# Patient Record
Sex: FEMALE | Race: WHITE | Employment: OTHER | URBAN - METROPOLITAN AREA
[De-identification: names, ages, dates, MRNs, and addresses within clinical notes are randomized per-mention and may not be internally consistent; named-entity substitution may affect disease eponyms.]

---

## 2017-07-08 ENCOUNTER — ED HISTORICAL/CONVERTED ENCOUNTER (OUTPATIENT)
Dept: OTHER | Age: 57
End: 2017-07-08

## 2017-09-05 ENCOUNTER — ED HISTORICAL/CONVERTED ENCOUNTER (OUTPATIENT)
Dept: OTHER | Age: 57
End: 2017-09-05

## 2017-09-21 ENCOUNTER — OP HISTORICAL/CONVERTED ENCOUNTER (OUTPATIENT)
Dept: OTHER | Age: 57
End: 2017-09-21

## 2022-04-08 ENCOUNTER — HOSPITAL ENCOUNTER (OUTPATIENT)
Age: 62
Setting detail: OUTPATIENT SURGERY
Discharge: HOME | End: 2022-04-08
Attending: INTERNAL MEDICINE | Admitting: INTERNAL MEDICINE
Payer: MEDICARE

## 2022-04-08 ENCOUNTER — ANESTHESIA (OUTPATIENT)
Dept: ENDOSCOPY | Age: 62
End: 2022-04-08
Payer: MEDICARE

## 2022-04-08 ENCOUNTER — ANESTHESIA EVENT (OUTPATIENT)
Dept: ENDOSCOPY | Age: 62
End: 2022-04-08
Payer: MEDICARE

## 2022-04-08 VITALS
HEART RATE: 72 BPM | RESPIRATION RATE: 19 BRPM | TEMPERATURE: 98.4 F | SYSTOLIC BLOOD PRESSURE: 134 MMHG | HEIGHT: 63 IN | DIASTOLIC BLOOD PRESSURE: 71 MMHG | WEIGHT: 136.91 LBS | BODY MASS INDEX: 24.26 KG/M2 | OXYGEN SATURATION: 97 %

## 2022-04-08 PROCEDURE — 2709999900 HC NON-CHARGEABLE SUPPLY: Performed by: INTERNAL MEDICINE

## 2022-04-08 PROCEDURE — 76040000019: Performed by: INTERNAL MEDICINE

## 2022-04-08 PROCEDURE — 74011250636 HC RX REV CODE- 250/636: Performed by: NURSE ANESTHETIST, CERTIFIED REGISTERED

## 2022-04-08 PROCEDURE — 76060000031 HC ANESTHESIA FIRST 0.5 HR: Performed by: INTERNAL MEDICINE

## 2022-04-08 RX ORDER — NAPROXEN SODIUM 220 MG
220 TABLET ORAL 2 TIMES DAILY WITH MEALS
COMMUNITY

## 2022-04-08 RX ORDER — MIDAZOLAM HYDROCHLORIDE 1 MG/ML
.25-5 INJECTION, SOLUTION INTRAMUSCULAR; INTRAVENOUS
Status: DISCONTINUED | OUTPATIENT
Start: 2022-04-08 | End: 2022-04-08 | Stop reason: HOSPADM

## 2022-04-08 RX ORDER — SODIUM CHLORIDE 9 MG/ML
INJECTION, SOLUTION INTRAVENOUS
Status: DISCONTINUED | OUTPATIENT
Start: 2022-04-08 | End: 2022-04-08 | Stop reason: HOSPADM

## 2022-04-08 RX ORDER — NALOXONE HYDROCHLORIDE 0.4 MG/ML
0.4 INJECTION, SOLUTION INTRAMUSCULAR; INTRAVENOUS; SUBCUTANEOUS
Status: DISCONTINUED | OUTPATIENT
Start: 2022-04-08 | End: 2022-04-08 | Stop reason: HOSPADM

## 2022-04-08 RX ORDER — ESOMEPRAZOLE MAGNESIUM 40 MG/1
CAPSULE, DELAYED RELEASE ORAL DAILY
COMMUNITY

## 2022-04-08 RX ORDER — PROPOFOL 10 MG/ML
INJECTION, EMULSION INTRAVENOUS AS NEEDED
Status: DISCONTINUED | OUTPATIENT
Start: 2022-04-08 | End: 2022-04-08 | Stop reason: HOSPADM

## 2022-04-08 RX ORDER — UREA 10 %
100 LOTION (ML) TOPICAL DAILY
COMMUNITY

## 2022-04-08 RX ORDER — ALENDRONATE SODIUM 70 MG/1
TABLET ORAL
COMMUNITY

## 2022-04-08 RX ORDER — SODIUM CHLORIDE 9 MG/ML
50 INJECTION, SOLUTION INTRAVENOUS CONTINUOUS
Status: DISCONTINUED | OUTPATIENT
Start: 2022-04-08 | End: 2022-04-08 | Stop reason: HOSPADM

## 2022-04-08 RX ORDER — ATROPINE SULFATE 0.1 MG/ML
0.5 INJECTION INTRAVENOUS
Status: DISCONTINUED | OUTPATIENT
Start: 2022-04-08 | End: 2022-04-08 | Stop reason: HOSPADM

## 2022-04-08 RX ORDER — FLUMAZENIL 0.1 MG/ML
0.2 INJECTION INTRAVENOUS
Status: DISCONTINUED | OUTPATIENT
Start: 2022-04-08 | End: 2022-04-08 | Stop reason: HOSPADM

## 2022-04-08 RX ORDER — EPINEPHRINE 0.1 MG/ML
1 INJECTION INTRACARDIAC; INTRAVENOUS
Status: DISCONTINUED | OUTPATIENT
Start: 2022-04-08 | End: 2022-04-08 | Stop reason: HOSPADM

## 2022-04-08 RX ORDER — PROPOFOL 10 MG/ML
INJECTION, EMULSION INTRAVENOUS
Status: DISCONTINUED | OUTPATIENT
Start: 2022-04-08 | End: 2022-04-08 | Stop reason: HOSPADM

## 2022-04-08 RX ORDER — FENTANYL CITRATE 50 UG/ML
100 INJECTION, SOLUTION INTRAMUSCULAR; INTRAVENOUS
Status: DISCONTINUED | OUTPATIENT
Start: 2022-04-08 | End: 2022-04-08 | Stop reason: HOSPADM

## 2022-04-08 RX ORDER — OMEPRAZOLE 40 MG/1
40 CAPSULE, DELAYED RELEASE ORAL DAILY
COMMUNITY

## 2022-04-08 RX ORDER — DEXTROMETHORPHAN/PSEUDOEPHED 2.5-7.5/.8
1.2 DROPS ORAL
Status: DISCONTINUED | OUTPATIENT
Start: 2022-04-08 | End: 2022-04-08 | Stop reason: HOSPADM

## 2022-04-08 RX ADMIN — PROPOFOL 200 MCG/KG/MIN: 10 INJECTION, EMULSION INTRAVENOUS at 14:16

## 2022-04-08 RX ADMIN — SODIUM CHLORIDE: 9 INJECTION, SOLUTION INTRAVENOUS at 14:12

## 2022-04-08 RX ADMIN — PROPOFOL INJECTABLE EMULSION 50 MG: 10 INJECTION, EMULSION INTRAVENOUS at 14:16

## 2022-04-08 NOTE — ANESTHESIA POSTPROCEDURE EVALUATION
Procedure(s):  COLONOSCOPY. MAC    Anesthesia Post Evaluation      Multimodal analgesia: multimodal analgesia not used between 6 hours prior to anesthesia start to PACU discharge  Patient location during evaluation: PACU  Patient participation: complete - patient participated  Level of consciousness: awake and alert  Pain score: 0  Pain management: adequate  Airway patency: patent  Anesthetic complications: no  Cardiovascular status: hemodynamically stable and acceptable  Respiratory status: acceptable  Hydration status: acceptable  Comments: Patient seen and evaluated; no concerns. Post anesthesia nausea and vomiting:  none      INITIAL Post-op Vital signs:   Vitals Value Taken Time   /65 04/08/22 1445   Temp 36.9 °C (98.4 °F) 04/08/22 1435   Pulse 78 04/08/22 1447   Resp 21 04/08/22 1447   SpO2 96 % 04/08/22 1447   Vitals shown include unvalidated device data.

## 2022-04-08 NOTE — ANESTHESIA PREPROCEDURE EVALUATION
Relevant Problems   No relevant active problems       Anesthetic History   No history of anesthetic complications            Review of Systems / Medical History  Patient summary reviewed, nursing notes reviewed and pertinent labs reviewed    Pulmonary          Smoker         Neuro/Psych   Within defined limits           Cardiovascular  Within defined limits                Exercise tolerance: >4 METS     GI/Hepatic/Renal  Within defined limits              Endo/Other        Arthritis     Other Findings              Physical Exam    Airway  Mallampati: II    Neck ROM: normal range of motion   Mouth opening: Normal     Cardiovascular  Regular rate and rhythm,  S1 and S2 normal,  no murmur, click, rub, or gallop  Rhythm: regular  Rate: normal         Dental  No notable dental hx       Pulmonary  Breath sounds clear to auscultation               Abdominal  GI exam deferred       Other Findings            Anesthetic Plan    ASA: 2  Anesthesia type: MAC          Induction: Intravenous  Anesthetic plan and risks discussed with: Patient

## 2022-04-08 NOTE — H&P
Gastroenterology Outpatient History and Physical    Patient: Saurabh Nguyễn    Physician: Marta Hubbard MD    Vital Signs: See nursing notes    Allergies: No Known Allergies    Chief Complaint: Polyp surveillance    History of Present Illness: No symptoms; no chest pain, SOB, edema, focal weakness, numbness    Justification for Procedure: Polyp surveillance    History:  Past Medical History:   Diagnosis Date    Arthritis     shoulder, neck    Psychiatric disorder     depression,anxiety      Past Surgical History:   Procedure Laterality Date    HX HEENT      teeth extracted for periodontal disease    HX HEENT Bilateral     Lasik      Social History     Socioeconomic History    Marital status:    Tobacco Use    Smoking status: Former Smoker     Packs/day: 0.25     Years: 30.00     Pack years: 7.50     Quit date: 11/15/2013     Years since quittin.3    Smokeless tobacco: Never Used   Substance and Sexual Activity    Alcohol use: No    Drug use: No      Family History   Problem Relation Age of Onset   [facility] Cancer Father         lung       Medications:   Prior to Admission medications    Medication Sig Start Date End Date Taking? Authorizing Provider   oxyCODONE-acetaminophen (PERCOCET) 5-325 mg per tablet Take 1 Tab by mouth every four (4) hours as needed for Pain. 14   Elizabeth Tidwell MD   cyclobenzaprine (FLEXERIL) 10 mg tablet Take 1 Tab by mouth three (3) times daily as needed for Muscle Spasm(s). 14   Elizabeth Tidwell MD   mupirocin (BACTROBAN) 2 % ointment Apply  to affected area two (2) times a day. 14   Yarelis Mc MD   albuterol (PROVENTIL, VENTOLIN) 90 mcg/actuation inhaler Take 1-2 Puffs by inhalation every four (4) hours as needed for Wheezing. 13   Chay Day MD       Physical Exam:   General: alert, cooperative, no distress   HEENT: Head: Normal, normocephalic, atraumatic.    Heart: regular rate and rhythm   Lungs: chest clear, no wheezing, rales, normal symmetric air entry   Abdominal: Bowel sounds are normal, liver is not enlarged, spleen is not enlarged           Findings/Diagnosis: Polyp surveillance    Plan of Care/Planned Procedure: I've discussed colonoscopy possible biopsy, polypectomy, cautery, injection, alternatives, complications including but not limited to pain, cardiopulmonary event, bleeding, perforation requiring additional blood transfusion or operative repair; all questions answered.

## 2022-04-08 NOTE — PROGRESS NOTES
Edith Leachstone  1960  [id number]    Situation:  Verbal report received from: Coral Stagers, RN  Procedure: Procedure(s):  COLONOSCOPY    Background:    Preoperative diagnosis: PERSONAL HISTORY POLYPS  Postoperative diagnosis: 1. diverticulosis    :  Dr. Angelica Travis  Assistant(s): Endoscopy RN-1: Ana M Mercer RN  Endoscopy RN-2: Dayna Lewis RN    Specimens: * No specimens in log *  H. Pylori  no    Assessment:  Intra-procedure medications     Anesthesia gave intra-procedure sedation and medications, see anesthesia flow sheet yes    Intravenous fluids: NS@ KVO     Vital signs stable yes    Abdominal assessment: round and soft yes    Recommendation:  Discharge patient per MD order yes.   Return to floor na  Family or Friend family  Permission to share finding with family or friend yes

## 2022-04-08 NOTE — PROGRESS NOTES
Endoscopy discharge instructions have been reviewed and given to patient. The patient verbalized understanding and acceptance of instructions. Dr. Lauren Mak discussed with patient procedure findings and next steps.

## 2022-04-08 NOTE — DISCHARGE INSTRUCTIONS
Ameena Miller  [id number]  1960    DISCHARGE INSTRUCTIONS    Results:    Impression:  personal history polyps without recurrence; incidental finding colonic diverticulosis without inflammation. Recommendations:      - high-fiber diet, 10 year routine follow-up exam  Discomfort:  Redness at IV site- apply warm compress to area; if redness or soreness persist- contact your physician. There may be a slight amount of blood passed from the rectum. Gaseous discomfort - walking, belching will help relieve any discomfort. You may not operate a vehicle for 12 hours. You may not engage in an occupation involving machinery or appliances for rest of today. You may not drink alcoholic beverages for at least 12 hours. Avoid making any critical decisions for at least 24 hours. DIET:   High fiber diet. Medications:                Resume usual medications today   ACTIVITY:  You may resume your normal daily activities it is recommended that you spend the remainder of the day resting -  avoid any strenuous activity. CALL M.D. ANY SIGN OF:   Increasing pain, nausea, vomiting  Abdominal distension (swelling)  New increased bleeding (oral or rectal)  Fever (chills)  Pain in chest area  Bloody discharge from nose or mouth  Shortness of breath     Follow-up Instructions:  Call Dr. Lauren Lundberg if you have any questions or problems.           DISCHARGE SUMMARY from Nurse    The following personal items collected during your admission are returned to you:   Dental Appliance: Dental Appliances: Uppers  Vision: Visual Aid: None  Hearing Aid:    Jewelry:    Clothing:    Other Valuables:    Valuables sent to safe:

## 2022-04-08 NOTE — PERIOP NOTES
Report from [facility] see anesthesia record. Abdomen remains soft, non-tender; pt denies pain. Scope pre-cleaned at bedside by [facility] CASPER WHITE/Jamel. Report given to [facility] in Recovery.

## 2022-04-08 NOTE — PROCEDURES
[address]  [phone]      2022    Colonoscopy Procedure Note  Nuvia Valderrama  :  1960  [facility] Medical Record Number: [id number]    Indications:     Personal history of colon polyps (screening only)  PCP:  Lalito Meraz MD  Anesthesia/Sedation: see nursing notes  Endoscopist:  Dr. Felix Powers  Assistants: None  Complications:  None  Estimate Blood Loss:  None    Permit:  The indications, risks, benefits and alternatives were reviewed with the patient or their decision maker who was provided an opportunity to ask questions and all questions were answered. The specific risks of colonoscopy with conscious sedation were reviewed, including but not limited to anesthetic complication, bleeding, adverse drug reaction, missed lesion, infection, IV site reactions, and intestinal perforation which would lead to the need for surgical repair. Alternatives to colonoscopy including radiographic imaging, observation without testing, or laboratory testing were reviewed including the limitations of those alternatives. After considering the options and having all their questions answered, the patient or their decision maker provided both verbal and written consent to proceed. Procedure in Detail:  After obtaining informed consent, positioning of the patient in the left lateral decubitus position, and conduction of a pre-procedure pause or \"time out\" the endoscope was introduced into the anus and advanced to the cecum, which was identified by the ileocecal valve and appendiceal orifice. The quality of the colonic preparation was good. A careful inspection was made as the colonoscope was withdrawn, findings and interventions are described below.     Appendiceal orifice photographed    Findings:   no mucosal lesion appreciated      - Diverticulosis without inflammation    Specimens:    none    Implants: none    Complications:   None; patient tolerated the procedure well. Estimated blood loss: none    Impression:  personal history polyps without recurrence; incidental finding colonic diverticulosis without inflammation. Recommendations:      - high-fiber diet, 10 year routine follow-up exam    Thank you for entrusting me with this patient's care. Please do not hesitate to contact me with any questions or if I can be of assistance with any of your other patients' GI needs.     Signed By: Michael Andre MD                        April 8, 2022

## 2023-03-15 ENCOUNTER — HOSPITAL ENCOUNTER (OUTPATIENT)
Dept: GENERAL RADIOLOGY | Age: 63
Discharge: HOME | End: 2023-03-15
Attending: PODIATRIST
Payer: MEDICARE

## 2023-03-15 ENCOUNTER — HOSPITAL ENCOUNTER (OUTPATIENT)
Dept: PREADMISSION TESTING | Age: 63
Discharge: HOME | End: 2023-03-15
Payer: MEDICARE

## 2023-03-15 VITALS
HEIGHT: 63 IN | SYSTOLIC BLOOD PRESSURE: 138 MMHG | RESPIRATION RATE: 16 BRPM | BODY MASS INDEX: 25.98 KG/M2 | TEMPERATURE: 98 F | OXYGEN SATURATION: 98 % | HEART RATE: 72 BPM | WEIGHT: 146.61 LBS | DIASTOLIC BLOOD PRESSURE: 75 MMHG

## 2023-03-15 LAB
ALBUMIN SERPL-MCNC: 4 G/DL (ref 3.5–5)
ALBUMIN/GLOB SERPL: 1.2 (ref 1.1–2.2)
ALP SERPL-CCNC: 108 U/L (ref 45–117)
ALT SERPL-CCNC: 17 U/L (ref 12–78)
ANION GAP SERPL CALC-SCNC: 2 MMOL/L (ref 5–15)
AST SERPL-CCNC: 15 U/L (ref 15–37)
ATRIAL RATE: 80 BPM
BILIRUB SERPL-MCNC: 0.5 MG/DL (ref 0.2–1)
BUN SERPL-MCNC: 12 MG/DL (ref 6–20)
BUN/CREAT SERPL: 17 (ref 12–20)
CALCIUM SERPL-MCNC: 9.5 MG/DL (ref 8.5–10.1)
CALCULATED P AXIS, ECG09: 6 DEGREES
CALCULATED R AXIS, ECG10: 16 DEGREES
CALCULATED T AXIS, ECG11: 59 DEGREES
CHLORIDE SERPL-SCNC: 109 MMOL/L (ref 97–108)
CO2 SERPL-SCNC: 29 MMOL/L (ref 21–32)
CREAT SERPL-MCNC: 0.7 MG/DL (ref 0.55–1.02)
DIAGNOSIS, 93000: NORMAL
GLOBULIN SER CALC-MCNC: 3.4 G/DL (ref 2–4)
GLUCOSE SERPL-MCNC: 95 MG/DL (ref 65–100)
P-R INTERVAL, ECG05: 166 MS
POTASSIUM SERPL-SCNC: 4.2 MMOL/L (ref 3.5–5.1)
PROT SERPL-MCNC: 7.4 G/DL (ref 6.4–8.2)
Q-T INTERVAL, ECG07: 358 MS
QRS DURATION, ECG06: 82 MS
QTC CALCULATION (BEZET), ECG08: 412 MS
SODIUM SERPL-SCNC: 140 MMOL/L (ref 136–145)
VENTRICULAR RATE, ECG03: 80 BPM

## 2023-03-15 PROCEDURE — 80053 COMPREHEN METABOLIC PANEL: CPT

## 2023-03-15 PROCEDURE — 93005 ELECTROCARDIOGRAM TRACING: CPT

## 2023-03-15 PROCEDURE — 71046 X-RAY EXAM CHEST 2 VIEWS: CPT

## 2023-03-15 PROCEDURE — 36415 COLL VENOUS BLD VENIPUNCTURE: CPT

## 2023-03-15 RX ORDER — ATORVASTATIN CALCIUM 10 MG/1
10 TABLET, FILM COATED ORAL
COMMUNITY

## 2023-03-15 RX ORDER — ASPIRIN 81 MG/1
81 TABLET ORAL
COMMUNITY

## 2023-03-15 NOTE — PERIOP NOTES
[address] [facility] [address], [address] [facility]   MAIN OR                                  [phone]   MAIN PRE OP                          [phone]                                                                                AMBULATORY PRE OP          [phone]  PRE-ADMISSION TESTING    [phone]   Surgery Date: Wednesday 3/22/23       Is surgery arrival time given by surgeon? NO  If NO, [facility] staff will call you between 3 and 7pm the day before your surgery with your arrival time. (If your surgery is on a Monday, we will call you the Friday before.)    Call [phone] after 7pm Monday-Friday if you did not receive this call. INSTRUCTIONS BEFORE YOUR SURGERY   When You  Arrive Arrive at the 2nd [address] [facility] on the day of your surgery  Have your insurance card, photo ID, and any copayment (if needed)   Food   and   Drink NO food or drink after midnight the night before surgery    This means NO water, gum, mints, coffee, juice, etc.  No alcohol (beer, wine, liquor) 24 hours before and after surgery   Medications to   TAKE   Morning of Surgery MEDICATIONS TO TAKE THE MORNING OF SURGERY WITH A SIP OF WATER:   Trelegy  Esomeprazole     Medications  To  STOP      7 days before surgery Non-Steroidal anti-inflammatory Drugs (NSAID's): for example, Ibuprofen (Advil, Motrin), Naproxen (Aleve)  Aspirin, if taking for pain   Herbal supplements, vitamins, and fish oil  Other:  (Pain medications not listed above, including Tylenol may be taken)   Blood  Thinners    Bathing Clothing  Jewelry  Valuables     If you shower the morning of surgery, please do not apply anything to your skin (lotions, powders, deodorant, or makeup, especially mascara)  Follow Chlorhexidine [employer] body wash instructions provided to you during PAT appointment. Begin 3 days prior to surgery.   Do not shave or trim anywhere 24 hours before surgery  Wear your hair loose or down; no pony-tails, buns, or metal hair clips  Wear loose, comfortable, clean clothes  Wear glasses instead of contacts  Leave money, valuables, and jewelry, including body piercings, at home       Going Home - or Spending the Night SAME-DAY SURGERY: You must have a responsible adult drive you home and stay with you 24 hours after surgery  ADMITS: If your doctor is keeping you in the hospital after surgery, leave personal belongings/luggage in your car until you have a hospital room number. Hospital discharge time is 12 noon  Drivers must be here before 12 noon unless you are told differently   Special Instructions Please review educational handout and contact surgeon with questions about the procedure       Follow all instructions so your surgery wont be cancelled. Please, be on time. If a situation occurs and you are delayed the day of surgery, call [phone] or [phone]. If your physical condition changes (like a fever, cold, flu, etc.) call your surgeon. Home medication(s) reviewed and verified via     LIST   VERBAL   during PAT appointment. The patient was contacted by     IN-PERSON  The patient verbalizes understanding of all instructions and      DOES NOT   need reinforcement.

## 2023-03-15 NOTE — H&P
Gold Moulton was referred for evaluation by:Dr. Simeon Garcia for Pre- Op Evaluation. Please see encounter details and orders for consultative summary. Type of surgery : Right foot Silver bunionectomy, Right fifth metatarsal ostectomy  Surgery site : Right foot  Anesthesia type: MAC  Date of procedure:  3/22/2023    This 60y.o. year old female presents with complaints of pain to right foot; has bunion to great toe and bunionette to right 5th toe. Has been present for years but becoming more uncomfortable. Conservative measures have been exhausted prior to the decision for surgery. Patient has discussed the risks, alternatives, and benefits of the surgery with surgeon and has elected to proceed with surgical intervention. History of COPD and sees Dr Charmayne Chestnut with Pulmonary; reports she has been stable since starting Trelegy. Allergies: No Known Allergies  Latex allergy: no  Prior reactions to anesthesia:  Early awakening during iliac stenting and carpal tunnel surgery. Current Outpatient Medications   Medication Sig    atorvastatin (LIPITOR) 10 mg tablet Take 10 mg by mouth every morning. aspirin delayed-release 81 mg tablet Take 81 mg by mouth every morning. MULTIVITAMIN PO Take 1 Tablet by mouth every morning. BIOTIN PO Take 1 Tablet by mouth every morning. fluticasone/umeclidin/vilanter (TRELEGY ELLIPTA IN) Take 1 Puff by inhalation two (2) times a day. alendronate (FOSAMAX) 70 mg tablet Take 70 mg by mouth every seven (7) days. esomeprazole (NEXIUM) 40 mg capsule Take 40 mg by mouth every morning. No current facility-administered medications for this encounter.      Past Medical History:   Diagnosis Date    Adverse effect of anesthesia     woke up during iliac stenting and carpal tunnel release    Anxiety and depression     Arthritis     shoulder, neck    Cataract     not requiring surgery at this time    Chronic obstructive pulmonary disease ([facility] [address]     Diverticulitis Fibromyalgia     H/O seasonal allergies     Lumbar herniated disc     x3    PAD (peripheral artery disease) ([facility] [address]      Past Surgical History:   Procedure Laterality Date    COLONOSCOPY N/A 04/08/2022    COLONOSCOPY performed by Jamarcus Montoya MD at [address] Right     HX CERVICAL FUSION  2013    C5-C7    HX REFRACTIVE SURGERY Bilateral 2003    HX VEIN STRIPPING Right 2018    ME UNLISTED PROCEDURE VASCULAR SURGERY  2022    stents placed bilateral iliac arteries     Social History     Tobacco Use    Smoking status: Some Days     Packs/day: 0.25     Years: 40.00     Pack years: 10.00     Types: Cigarettes    Smokeless tobacco: Never    Tobacco comments:     Smoking sporadically   Vaping Use    Vaping Use: Never used   Substance Use Topics    Alcohol use: No    Drug use: No     Family History   Problem Relation Age of Onset    Cancer Father         lung        Pre-Operative Risk Assessment Using 2014 ACC/AHA Guidelines     Emergent procedure: No  Active Cardiac Condition including decompensated HF, Arrhythmia, MI <3 weeks, severe valve disease: No  Risk Level of Procedure: Intermediate Risk (intraperitoneal, intrathoracic, HENT, orthopedic, or carotid endarterectomy, etc.)  Revised Cardiac Risk Index Risk factors: None 3.9 % risk of cardiac complications during or around noncardiac surgery (30 day risk of Death, MI or Cardiac arrest). Measurement of Exercise Tolerance before Surgery >4 METS (climbing > 1 flight of stairs without stopping, walking up hill > 1-2 blocks, scrubbing floors, moving furniture, golf, bowling, dancing or tennis, or running short distance): Yes    History of cardiac (including arrhythmic or valvular) or lung issues? COPD  Personal or family of bleeding issues? No  Hx of HTN? No        Controlled? N/A     Smoker? 0.25 PPD x 40+ years  Etoh or other substance use? No     On anticoagulation, insulin, or steroids? No  Any concern with current medications? No  Pacemaker present? No        and if so, has it been interrogated in the last 12 months? N/A  Known TIANA? No   Known liver disease? No    Blood pressure 138/75, pulse 72, temperature 98 °F (36.7 °C), resp. rate 16, height 5' 3\" (1.6 m), weight 66.5 kg (146 lb 9.7 oz), SpO2 98 %. Review of Systems  Review of Systems   Constitutional:  Negative for chills, fatigue and fever. HENT:  Negative for congestion, ear pain, postnasal drip and trouble swallowing. Eyes:  Negative for discharge. Respiratory:  Negative for cough, shortness of breath and wheezing. Cardiovascular:  Negative for chest pain, palpitations and leg swelling. Gastrointestinal:  Negative for abdominal pain, constipation, diarrhea, nausea and vomiting. Genitourinary:  Negative for dysuria, flank pain, frequency and urgency. Musculoskeletal:  Positive for arthralgias (right foot). Skin:  Negative for rash and wound. Neurological:  Negative for light-headedness and headaches. Psychiatric/Behavioral:  Negative for dysphoric mood. The patient is not nervous/anxious. Physical Exam  Vitals and nursing note reviewed. Exam conducted with a chaperone present. Constitutional:       General: She is not in acute distress. Appearance: Normal appearance. HENT:      Head: Normocephalic and atraumatic. Right Ear: Tympanic membrane, ear canal and external ear normal.      Left Ear: Tympanic membrane and external ear normal.      Nose: Nose normal.      Mouth/Throat:      Mouth: Mucous membranes are moist.      Pharynx: Oropharynx is clear. Eyes:      Extraocular Movements: Extraocular movements intact. Cardiovascular:      Rate and Rhythm: Normal rate and regular rhythm. Pulses: Normal pulses. Heart sounds: Normal heart sounds. Pulmonary:      Effort: Pulmonary effort is normal.      Breath sounds: Normal breath sounds. Abdominal:      General: Bowel sounds are normal.      Palpations: Abdomen is soft. Tenderness: There is no abdominal tenderness. There is no right CVA tenderness or left CVA tenderness. Musculoskeletal:         General: Normal range of motion. Cervical back: Normal range of motion and neck supple. Right foot: Bunion present. Feet:      Right foot:      Skin integrity: Callus present. Comments: Bunion deformity to right great toe MTP joint and 5th MTP joint with callous formation  Skin:     General: Skin is warm and dry. Neurological:      General: No focal deficit present. Mental Status: She is alert and oriented to person, place, and time. Psychiatric:         Mood and Affect: Mood normal.        Assessment  Right foot bunion  Preoperative evaluation     Plan  Labs and EKG pending  Plan for Right foot Silver bunionectomy, Right fifth metatarsal ostectomy    According to the 2014 ACC/AHA pre-operative risk assessment guidelines [place] Smart is at low risk for major cardiac complications during a Intermediate Risk procedure, exercise tolerance is >4 METS  and procedure may proceed as planned.      Request further recommendations from consultants: None

## 2023-03-21 ENCOUNTER — ANESTHESIA EVENT (OUTPATIENT)
Dept: SURGERY | Age: 63
End: 2023-03-21
Payer: MEDICARE

## 2023-03-22 ENCOUNTER — HOSPITAL ENCOUNTER (OUTPATIENT)
Age: 63
Setting detail: OUTPATIENT SURGERY
Discharge: HOME | End: 2023-03-22
Attending: PODIATRIST | Admitting: PODIATRIST
Payer: MEDICARE

## 2023-03-22 ENCOUNTER — ANESTHESIA (OUTPATIENT)
Dept: SURGERY | Age: 63
End: 2023-03-22
Payer: MEDICARE

## 2023-03-22 ENCOUNTER — HOSPITAL ENCOUNTER (OUTPATIENT)
Dept: GENERAL RADIOLOGY | Age: 63
Discharge: HOME | End: 2023-03-22
Attending: PODIATRIST

## 2023-03-22 VITALS
RESPIRATION RATE: 22 BRPM | OXYGEN SATURATION: 97 % | WEIGHT: 145.28 LBS | SYSTOLIC BLOOD PRESSURE: 105 MMHG | HEIGHT: 63 IN | HEART RATE: 101 BPM | DIASTOLIC BLOOD PRESSURE: 89 MMHG | BODY MASS INDEX: 25.74 KG/M2 | TEMPERATURE: 98.6 F

## 2023-03-22 DIAGNOSIS — G89.18 POST-OP PAIN: Primary | ICD-10-CM

## 2023-03-22 PROCEDURE — 74011250637 HC RX REV CODE- 250/637: Performed by: ANESTHESIOLOGY

## 2023-03-22 PROCEDURE — 74011250636 HC RX REV CODE- 250/636: Performed by: PODIATRIST

## 2023-03-22 PROCEDURE — 76210000046 HC AMBSU PH II REC FIRST 0.5 HR: Performed by: PODIATRIST

## 2023-03-22 PROCEDURE — 77030040361 HC SLV COMPR DVT MDII -B

## 2023-03-22 PROCEDURE — 77030020269 HC MISC IMPL: Performed by: PODIATRIST

## 2023-03-22 PROCEDURE — 74011250636 HC RX REV CODE- 250/636: Performed by: ANESTHESIOLOGY

## 2023-03-22 PROCEDURE — 77030006773 HC BLD SAW OSC BRSM -A: Performed by: PODIATRIST

## 2023-03-22 PROCEDURE — 77030000032 HC CUF TRNQT ZIMM -B: Performed by: PODIATRIST

## 2023-03-22 PROCEDURE — 2709999900 HC NON-CHARGEABLE SUPPLY: Performed by: PODIATRIST

## 2023-03-22 PROCEDURE — C1713 ANCHOR/SCREW BN/BN,TIS/BN: HCPCS | Performed by: PODIATRIST

## 2023-03-22 PROCEDURE — 76030000001 HC AMB SURG OR TIME 1 TO 1.5: Performed by: PODIATRIST

## 2023-03-22 PROCEDURE — 74011250636 HC RX REV CODE- 250/636: Performed by: NURSE ANESTHETIST, CERTIFIED REGISTERED

## 2023-03-22 PROCEDURE — 77030002933 HC SUT MCRYL J&J -A: Performed by: PODIATRIST

## 2023-03-22 PROCEDURE — 76060000062 HC AMB SURG ANES 1 TO 1.5 HR: Performed by: PODIATRIST

## 2023-03-22 PROCEDURE — 76210000040 HC AMBSU PH I REC FIRST 0.5 HR: Performed by: PODIATRIST

## 2023-03-22 PROCEDURE — 74011000250 HC RX REV CODE- 250: Performed by: PODIATRIST

## 2023-03-22 PROCEDURE — 77030040922 HC BLNKT HYPOTHRM STRY -A

## 2023-03-22 PROCEDURE — 74011000250 HC RX REV CODE- 250: Performed by: NURSE ANESTHETIST, CERTIFIED REGISTERED

## 2023-03-22 PROCEDURE — 77030003601 HC NDL NRV BLK BBMI -A

## 2023-03-22 DEVICE — TWIST-OFF SCREW
Type: IMPLANTABLE DEVICE | Site: FOOT | Status: FUNCTIONAL
Brand: FIXOS

## 2023-03-22 DEVICE — IMPLANTABLE DEVICE
Type: IMPLANTABLE DEVICE | Site: FOOT | Status: FUNCTIONAL
Brand: FUSEFORCE

## 2023-03-22 DEVICE — GRAFT HUM TISS AMBIENT 2 CC FLOWABLE PLCNTA TISS VIAFLOW: Type: IMPLANTABLE DEVICE | Site: FOOT | Status: FUNCTIONAL

## 2023-03-22 RX ORDER — HYDROMORPHONE HYDROCHLORIDE 1 MG/ML
0.5 INJECTION, SOLUTION INTRAMUSCULAR; INTRAVENOUS; SUBCUTANEOUS
Status: DISCONTINUED | OUTPATIENT
Start: 2023-03-22 | End: 2023-03-22 | Stop reason: HOSPADM

## 2023-03-22 RX ORDER — SODIUM CHLORIDE, SODIUM LACTATE, POTASSIUM CHLORIDE, CALCIUM CHLORIDE 600; 310; 30; 20 MG/100ML; MG/100ML; MG/100ML; MG/100ML
125 INJECTION, SOLUTION INTRAVENOUS CONTINUOUS
Status: DISCONTINUED | OUTPATIENT
Start: 2023-03-22 | End: 2023-03-22 | Stop reason: HOSPADM

## 2023-03-22 RX ORDER — PROPOFOL 10 MG/ML
INJECTION, EMULSION INTRAVENOUS AS NEEDED
Status: DISCONTINUED | OUTPATIENT
Start: 2023-03-22 | End: 2023-03-22 | Stop reason: HOSPADM

## 2023-03-22 RX ORDER — SODIUM CHLORIDE, SODIUM LACTATE, POTASSIUM CHLORIDE, CALCIUM CHLORIDE 600; 310; 30; 20 MG/100ML; MG/100ML; MG/100ML; MG/100ML
150 INJECTION, SOLUTION INTRAVENOUS CONTINUOUS
Status: DISCONTINUED | OUTPATIENT
Start: 2023-03-22 | End: 2023-03-22 | Stop reason: HOSPADM

## 2023-03-22 RX ORDER — PROPOFOL 10 MG/ML
INJECTION, EMULSION INTRAVENOUS
Status: DISCONTINUED | OUTPATIENT
Start: 2023-03-22 | End: 2023-03-22 | Stop reason: HOSPADM

## 2023-03-22 RX ORDER — EPHEDRINE SULFATE/0.9% NACL/PF 50 MG/5 ML
SYRINGE (ML) INTRAVENOUS AS NEEDED
Status: DISCONTINUED | OUTPATIENT
Start: 2023-03-22 | End: 2023-03-22 | Stop reason: HOSPADM

## 2023-03-22 RX ORDER — FENTANYL CITRATE 50 UG/ML
INJECTION, SOLUTION INTRAMUSCULAR; INTRAVENOUS AS NEEDED
Status: DISCONTINUED | OUTPATIENT
Start: 2023-03-22 | End: 2023-03-22 | Stop reason: HOSPADM

## 2023-03-22 RX ORDER — ACETAMINOPHEN 325 MG/1
650 TABLET ORAL ONCE
Status: COMPLETED | OUTPATIENT
Start: 2023-03-22 | End: 2023-03-22

## 2023-03-22 RX ORDER — ONDANSETRON 2 MG/ML
4 INJECTION INTRAMUSCULAR; INTRAVENOUS AS NEEDED
Status: DISCONTINUED | OUTPATIENT
Start: 2023-03-22 | End: 2023-03-22 | Stop reason: HOSPADM

## 2023-03-22 RX ORDER — ALBUTEROL SULFATE 0.83 MG/ML
2.5 SOLUTION RESPIRATORY (INHALATION) AS NEEDED
Status: DISCONTINUED | OUTPATIENT
Start: 2023-03-22 | End: 2023-03-22 | Stop reason: HOSPADM

## 2023-03-22 RX ORDER — OXYCODONE AND ACETAMINOPHEN 5; 325 MG/1; MG/1
2 TABLET ORAL
Qty: 30 TABLET | Refills: 0 | Status: SHIPPED | OUTPATIENT
Start: 2023-03-22 | End: 2023-03-25

## 2023-03-22 RX ORDER — ROPIVACAINE HYDROCHLORIDE 5 MG/ML
INJECTION, SOLUTION EPIDURAL; INFILTRATION; PERINEURAL AS NEEDED
Status: DISCONTINUED | OUTPATIENT
Start: 2023-03-22 | End: 2023-03-22 | Stop reason: HOSPADM

## 2023-03-22 RX ORDER — PHENYLEPHRINE HCL IN 0.9% NACL 0.4MG/10ML
SYRINGE (ML) INTRAVENOUS AS NEEDED
Status: DISCONTINUED | OUTPATIENT
Start: 2023-03-22 | End: 2023-03-22 | Stop reason: HOSPADM

## 2023-03-22 RX ORDER — DIPHENHYDRAMINE HYDROCHLORIDE 50 MG/ML
12.5 INJECTION, SOLUTION INTRAMUSCULAR; INTRAVENOUS AS NEEDED
Status: DISCONTINUED | OUTPATIENT
Start: 2023-03-22 | End: 2023-03-22 | Stop reason: HOSPADM

## 2023-03-22 RX ORDER — LIDOCAINE HYDROCHLORIDE 10 MG/ML
0.1 INJECTION, SOLUTION EPIDURAL; INFILTRATION; INTRACAUDAL; PERINEURAL AS NEEDED
Status: DISCONTINUED | OUTPATIENT
Start: 2023-03-22 | End: 2023-03-22 | Stop reason: HOSPADM

## 2023-03-22 RX ORDER — SODIUM CHLORIDE, SODIUM LACTATE, POTASSIUM CHLORIDE, CALCIUM CHLORIDE 600; 310; 30; 20 MG/100ML; MG/100ML; MG/100ML; MG/100ML
INJECTION, SOLUTION INTRAVENOUS
Status: DISCONTINUED | OUTPATIENT
Start: 2023-03-22 | End: 2023-03-22 | Stop reason: HOSPADM

## 2023-03-22 RX ORDER — MIDAZOLAM HYDROCHLORIDE 1 MG/ML
INJECTION, SOLUTION INTRAMUSCULAR; INTRAVENOUS AS NEEDED
Status: DISCONTINUED | OUTPATIENT
Start: 2023-03-22 | End: 2023-03-22 | Stop reason: HOSPADM

## 2023-03-22 RX ADMIN — Medication 80 MCG: at 11:52

## 2023-03-22 RX ADMIN — Medication 40 MCG: at 12:06

## 2023-03-22 RX ADMIN — FENTANYL CITRATE 50 MCG: 50 INJECTION, SOLUTION INTRAMUSCULAR; INTRAVENOUS at 10:36

## 2023-03-22 RX ADMIN — PROPOFOL 50 MG: 10 INJECTION, EMULSION INTRAVENOUS at 11:21

## 2023-03-22 RX ADMIN — Medication 10 MG: at 11:37

## 2023-03-22 RX ADMIN — Medication 80 MCG: at 12:16

## 2023-03-22 RX ADMIN — Medication 40 MCG: at 12:00

## 2023-03-22 RX ADMIN — MIDAZOLAM HYDROCHLORIDE 2 MG: 1 INJECTION, SOLUTION INTRAMUSCULAR; INTRAVENOUS at 10:36

## 2023-03-22 RX ADMIN — CEFAZOLIN SODIUM 2 G: 1 POWDER, FOR SOLUTION INTRAMUSCULAR; INTRAVENOUS at 11:26

## 2023-03-22 RX ADMIN — SODIUM CHLORIDE, POTASSIUM CHLORIDE, SODIUM LACTATE AND CALCIUM CHLORIDE 150 ML/HR: 600; 310; 30; 20 INJECTION, SOLUTION INTRAVENOUS at 09:55

## 2023-03-22 RX ADMIN — FENTANYL CITRATE 50 MCG: 50 INJECTION, SOLUTION INTRAMUSCULAR; INTRAVENOUS at 10:38

## 2023-03-22 RX ADMIN — ROPIVACAINE HYDROCHLORIDE 30 ML: 5 INJECTION, SOLUTION EPIDURAL; INFILTRATION; PERINEURAL at 10:41

## 2023-03-22 RX ADMIN — Medication 2600 MCG: at 11:37

## 2023-03-22 RX ADMIN — Medication 120 MCG: at 11:35

## 2023-03-22 RX ADMIN — ROPIVACAINE HYDROCHLORIDE 10 ML: 5 INJECTION, SOLUTION EPIDURAL; INFILTRATION; PERINEURAL at 10:46

## 2023-03-22 RX ADMIN — ACETAMINOPHEN 650 MG: 325 TABLET ORAL at 09:54

## 2023-03-22 RX ADMIN — PROPOFOL 100 MCG/KG/MIN: 10 INJECTION, EMULSION INTRAVENOUS at 11:22

## 2023-03-22 RX ADMIN — SODIUM CHLORIDE, SODIUM LACTATE, POTASSIUM CHLORIDE, AND CALCIUM CHLORIDE: 600; 310; 30; 20 INJECTION, SOLUTION INTRAVENOUS at 10:46

## 2023-03-22 NOTE — ANESTHESIA PREPROCEDURE EVALUATION
Relevant Problems   No relevant active problems       Anesthetic History   No history of anesthetic complications            Review of Systems / Medical History  Patient summary reviewed and nursing notes reviewed    Pulmonary    COPD: mild      Smoker         Neuro/Psych         Psychiatric history    Comments: Anxiety/depression Cardiovascular              PAD    Exercise tolerance: >4 METS     GI/Hepatic/Renal               Comments: Diverticolosis Endo/Other        Arthritis     Other Findings   Comments: Fibromyalgia           Physical Exam    Airway  Mallampati: II    Neck ROM: normal range of motion   Mouth opening: Normal     Cardiovascular    Rhythm: regular  Rate: normal         Dental    Dentition: Full upper dentures  Comments: No teeth bottom: upper plate at the top   Pulmonary  Breath sounds clear to auscultation               Abdominal         Other Findings            Anesthetic Plan    ASA: 3  Anesthesia type: MAC and regional - popliteal fossa block and saphenous block      Post-op pain plan if not by surgeon: peripheral nerve block single      Anesthetic plan and risks discussed with: Patient      Informed consent obtained.

## 2023-03-22 NOTE — ANESTHESIA POSTPROCEDURE EVALUATION
Procedure(s):  RIGHT FOOT BUNIONECTOMY, RIGHT FIFTH METATARSAL OSTECTOMY. MAC, regional    Anesthesia Post Evaluation      Multimodal analgesia: multimodal analgesia used between 6 hours prior to anesthesia start to PACU discharge  Patient location during evaluation: PACU  Patient participation: complete - patient participated  Level of consciousness: awake and alert  Pain score: 0  Pain management: adequate  Airway patency: patent  Anesthetic complications: no  Cardiovascular status: hemodynamically stable and acceptable  Respiratory status: acceptable  Hydration status: acceptable  Comments: Patient seen and evaluated; no concerns. Post anesthesia nausea and vomiting:  none  Final Post Anesthesia Temperature Assessment:  Normothermia (36.0-37.5 degrees C)      INITIAL Post-op Vital signs:   Vitals Value Taken Time   /89 03/22/23 1312   Temp 37 °C (98.6 °F) 03/22/23 1233   Pulse 90 03/22/23 1312   Resp 24 03/22/23 1312   SpO2 92 % 03/22/23 1312   Vitals shown include unvalidated device data.

## 2023-03-22 NOTE — OP NOTES
Operative Note    Patient: Zulema Marie  YOB: 1960  MRN: [id number]    Date of Procedure: 3/22/2023     Pre-Op Diagnosis: RIGHT FOOT HALLUX VALGUS, RIGHT BUNIONETTE    Post-Op Diagnosis: Same as preoperative diagnosis. Procedure(s):  RIGHT FOOT AKIN BUNIONECTOMY, RIGHT FIFTH METATARSAL OSTECTOMY    Surgeon(s):  Jessica Ramires DPM    Surgical Assistant: Surg Asst-1: Mamie MCKNIGHT    Anesthesia: MAC     Estimated Blood Loss (mL):  Minimal    Complications: None    Specimens: * No specimens in log *     Implants:   Implant Name Type Inv. Item Serial No.  Lot No. LRB No. Used Action   STAPLE BNE FIX [id number] NIT - SNA  STAPLE BNE FIX [id number] NIT NA [id number] Right 1 Implanted   viaflow placental tissue matrix 2.0   [id number]  NA Right 1 Implanted       Drains: * No LDAs found *    Findings: Reduction and hardware placement confirmed under fluoroscopy    Detailed Description of Procedure: Indications for procedure: Ms. Darrius Jennings is a 58-year-old female who presents to the preoperative holding area awaiting surgical intervention to the right lower extremity. Patient has had chronic pain to their right foot from a hallux valgus deformity and 5th metatarsal bunionette deformity. Patient has failed conservative treatment including activity and shoe gear modifications. The patient would like to proceed with surgical intervention at this time. All risks, benefits, and potential complications were discussed with patient in full details. No guarantees were made to the outcome of the surgical procedure. The patient's n.p.o. status was confirmed. Patient was marked and the surgical consent was signed in the preoperative holding area. Procedure in detail: The patient was identified the procedure was verified. Patient received a preoperative popliteal and saphenous block in the preoperative holding area by the anesthesia department.  The patient was wheeled back to the operating room and placed on the operating room table in normal anatomical supine position. Once the patient was under appropriate plane of anesthesia, the right lower extremity was prepped and draped in normal sterile fashion. Attention was then directed to the medial aspect of the right foot. A linear incision was made over the medial aspect of the first metatarsal phalangeal joint with a 15 blade extending down to bone the joint capsule which was reflected away from the underlying bone. A sagittal saw was used to resect the prominent 1st metatarsal medial eminence and cut the Akin osteotomy. The Akin osteotomy was secured using a bone staple. Attention was then directed to the lateral aspect of the right foot. A linear incision was made over the lateral aspect of the fifth metatarsal phalangeal joint with a 15 blade extending down to bone the joint capsule which was reflected away from the underlying bone. A sagittal saw was used to cut the 5th metatarsal transversely just proximal to the 5th metatarsal head. The capital fragment medially. The osteotomy was then secured using a snap off screw. The prominent lateral eminence of the fifth metatarsal head was further resected using a sagittal saw. The surgical sites were flushed with copious amounts of saline. The subcutaneous tissue was closed using 3-0 Vicryl the skin was closed using 4-0 Monocryl. The patient was determined to have tolerated both the procedure and anesthesia well without any postop complications. The patient was transported from the operating room to the postanesthesia care unit with vital signs stable and vascular status intact to the right lower extremity. The patient is to be nonweightbearing to the right lower extremity and has a postoperative visit with myself next week in office. The patient has any questions or concerns they can contact me directly.       Electronically Signed by Rodney Johnston YANDEL Ramires on 3/22/2023 at 12:25 PM

## 2023-03-22 NOTE — ANESTHESIA PROCEDURE NOTES
Peripheral Block    Start time: 3/22/2023 10:36 AM  End time: 3/22/2023 10:46 AM  Performed by: Josh Mak DO  Authorized by: Josh Mak DO       Pre-procedure: Indications: at surgeon's request and post-op pain management    Preanesthetic Checklist: patient identified, risks and benefits discussed, site marked, timeout performed, anesthesia consent given, patient being monitored and fire risk safety assessment completed and verbalized    Timeout Time: 10:36 EDT      Block Type:   Block Type:  Saphenous and popliteal  Laterality:  Right  Monitoring:  Continuous pulse ox, frequent vital sign checks, heart rate, responsive to questions and oxygen  Injection Technique:  Single shot  Procedures: ultrasound guided and nerve stimulator    Patient Position: supine  Prep: chlorhexidine    Needle Type:  Stimuplex  Needle Gauge:  21 G  Needle Localization:  Nerve stimulator and ultrasound guidance  Med Admin Time: 3/22/2023 10:46 AM    Assessment:  Number of attempts:  1  Injection Assessment:  Incremental injection every 5 mL, local visualized surrounding nerve on ultrasound, negative aspiration for blood, no paresthesia and no intravascular symptoms  Patient tolerance:  Patient tolerated the procedure well with no immediate complications  Saphenous block performed with ultrasound guidance; 4\" stimuplex 21g needle used, 10cc 0.5% ropivacaine injected slowly with intermittent aspiration. Emili Polk RN witnessed timeout and block written on correct side.

## 2023-03-22 NOTE — H&P
Surgery History and Physcial    Subjective:      Hector Taylor is a 60 y.o. female who presents for right foot pain due to right foot hallux valgus and bunionette deformity    There are no problems to display for this patient. Past Medical History:   Diagnosis Date    Adverse effect of anesthesia     woke up during iliac stenting and carpal tunnel release    Anxiety and depression     Arthritis     shoulder, neck    Cataract     not requiring surgery at this time    Chronic obstructive pulmonary disease (HCC)     Diverticulitis     Fibromyalgia     H/O seasonal allergies     Lumbar herniated disc     x3    PAD (peripheral artery disease) ([facility] [address]       Past Surgical History:   Procedure Laterality Date    COLONOSCOPY N/A 04/08/2022    COLONOSCOPY performed by Leanne Hinkle MD at [address] Right     HX CERVICAL FUSION  2013    C5-C7    HX REFRACTIVE SURGERY Bilateral 2003    [address] [facility] Right 2018    VT UNLISTED PROCEDURE VASCULAR SURGERY  2022    stents placed bilateral iliac arteries      Social History     Tobacco Use    Smoking status: Some Days     Packs/day: 0.25     Years: 40.00     Pack years: 10.00     Types: Cigarettes    Smokeless tobacco: Never    Tobacco comments:     Smoking sporadically   Substance Use Topics    Alcohol use: No      Family History   Problem Relation Age of Onset    Cancer Father         lung      Prior to Admission medications    Medication Sig Start Date End Date Taking? Authorizing Provider   BIOTIN PO Take 1 Tablet by mouth every morning. Yes Provider, Historical   atorvastatin (LIPITOR) 10 mg tablet Take 10 mg by mouth every morning. Provider, Historical   aspirin delayed-release 81 mg tablet Take 81 mg by mouth every morning. Provider, Historical   MULTIVITAMIN PO Take 1 Tablet by mouth every morning. Provider, Historical   fluticasone/umeclidin/vilanter (TRELEGY ELLIPTA IN) Take 1 Puff by inhalation two (2) times a day.     Provider, Historical   alendronate (FOSAMAX) 70 mg tablet Take 70 mg by mouth every seven (7) days. Provider, Historical   esomeprazole (NEXIUM) 40 mg capsule Take 40 mg by mouth every morning. Provider, Historical     No Known Allergies      Review of Systems   Constitutional: Negative. Objective:     Visit Vitals  /82 (BP 1 Location: Right upper arm, BP Patient Position: At rest;Lying)   Pulse 76   Temp 98.8 °F (37.1 °C)   Resp 15   Ht 5' 3\" (1.6 m)   Wt 65.9 kg (145 lb 4.5 oz)   SpO2 98%   BMI 25.74 kg/m²       Physical Exam  Cardiovascular:      Rate and Rhythm: Normal rate and regular rhythm. Pulmonary:      Effort: Pulmonary effort is normal.      Breath sounds: Normal breath sounds. Neurological:      Mental Status: She is alert. Imaging:  images and reports reviewed    Lab Review:  No results found for this or any previous visit (from the past 24 hour(s)).       Assessment:     Right hallux valgus and 5th metatarsal bunionette    Plan:     Right akin bunionectomy  Right 5th metatarsal osteotomy

## 2023-03-22 NOTE — DISCHARGE SUMMARY
Admit date: 3/22/2023   Admitting Provider: Kym Padron DPM    Discharge date: 3/22/2023  Discharging Provider: Kym Padron DPM      * Admission Diagnoses: RIGHT FOOT [address], RIGHT BUNIONETTE    * Discharge Diagnoses:  Same as admission      Procedure(s):  RIGHT FOOT SILVER BUNIONECTOMY, RIGHT FIFTH METATARSAL OSTECTOMY  Right foot akin osteotomy      Discharge Exam:  Visit Vitals  /82 (BP 1 Location: Right upper arm, BP Patient Position: At rest;Lying)   Pulse 76   Temp 98.8 °F (37.1 °C)   Resp 15   Ht 5' 3\" (1.6 m)   Wt 65.9 kg (145 lb 4.5 oz)   SpO2 98%   BMI 25.74 kg/m²     General appearance: alert, cooperative, no distress, appears stated age  Lungs: clear to auscultation bilaterally  Heart: regular rate and rhythm, S1, S2 normal, no murmur, click, rub or gallop  Extremities: normal post op swelling    * Discharge Condition: good  * Disposition: Home    Discharge Medications:  Current Discharge Medication List        CONTINUE these medications which have NOT CHANGED    Details   BIOTIN PO Take 1 Tablet by mouth every morning. atorvastatin (LIPITOR) 10 mg tablet Take 10 mg by mouth every morning. aspirin delayed-release 81 mg tablet Take 81 mg by mouth every morning. MULTIVITAMIN PO Take 1 Tablet by mouth every morning. fluticasone/umeclidin/vilanter (TRELEGY ELLIPTA IN) Take 1 Puff by inhalation two (2) times a day. alendronate (FOSAMAX) 70 mg tablet Take 70 mg by mouth every seven (7) days. esomeprazole (NEXIUM) 40 mg capsule Take 40 mg by mouth every morning. * Follow-up Care/Patient Instructions:   Activity: Minimal weightbearing  Diet: Regular Diet  Wound Care: Keep dressing dry, clean, and intact    Follow-up Information       Follow up With Specialties Details Why Contact Ruddy Estrada [facility]  [phone]              Signed:  HOWARD Collado [facility]  1/73/4332  12:22 PM

## 2024-01-11 ENCOUNTER — APPOINTMENT (OUTPATIENT)
Facility: HOSPITAL | Age: 64
End: 2024-01-11
Payer: MEDICARE

## 2024-01-11 ENCOUNTER — HOSPITAL ENCOUNTER (EMERGENCY)
Facility: HOSPITAL | Age: 64
Discharge: HOME | End: 2024-01-11
Attending: EMERGENCY MEDICINE
Payer: MEDICARE

## 2024-01-11 VITALS
SYSTOLIC BLOOD PRESSURE: 100 MMHG | WEIGHT: 143 LBS | TEMPERATURE: 97.7 F | HEIGHT: 63 IN | OXYGEN SATURATION: 96 % | RESPIRATION RATE: 24 BRPM | BODY MASS INDEX: 25.34 KG/M2 | HEART RATE: 84 BPM | DIASTOLIC BLOOD PRESSURE: 66 MMHG

## 2024-01-11 DIAGNOSIS — R55 NEAR SYNCOPE: Primary | ICD-10-CM

## 2024-01-11 LAB
ALBUMIN SERPL-MCNC: 3.8 G/DL (ref 3.5–5)
ALBUMIN/GLOB SERPL: 1.1 (ref 1.1–2.2)
ALP SERPL-CCNC: 131 U/L (ref 45–117)
ALT SERPL-CCNC: 24 U/L (ref 12–78)
ANION GAP SERPL CALC-SCNC: 4 MMOL/L (ref 5–15)
AST SERPL W P-5'-P-CCNC: 13 U/L (ref 15–37)
BASOPHILS # BLD: 0 K/UL (ref 0–0.1)
BASOPHILS NFR BLD: 0 % (ref 0–1)
BILIRUB SERPL-MCNC: 0.4 MG/DL (ref 0.2–1)
BUN SERPL-MCNC: 12 MG/DL (ref 6–20)
BUN/CREAT SERPL: 16 (ref 12–20)
CA-I BLD-MCNC: 8.9 MG/DL (ref 8.5–10.1)
CHLORIDE SERPL-SCNC: 106 MMOL/L (ref 97–108)
CO2 SERPL-SCNC: 31 MMOL/L (ref 21–32)
CREAT SERPL-MCNC: 0.77 MG/DL (ref 0.55–1.02)
DIFFERENTIAL METHOD BLD: ABNORMAL
EKG ATRIAL RATE: 90 BPM
EKG DIAGNOSIS: NORMAL
EKG P AXIS: 69 DEGREES
EKG P-R INTERVAL: 152 MS
EKG Q-T INTERVAL: 362 MS
EKG QRS DURATION: 70 MS
EKG QTC CALCULATION (BAZETT): 442 MS
EKG R AXIS: -11 DEGREES
EKG T AXIS: 50 DEGREES
EKG VENTRICULAR RATE: 90 BPM
EOSINOPHIL # BLD: 0.1 K/UL (ref 0–0.4)
EOSINOPHIL NFR BLD: 1 % (ref 0–7)
ERYTHROCYTE [DISTWIDTH] IN BLOOD BY AUTOMATED COUNT: 14.3 % (ref 11.5–14.5)
GLOBULIN SER CALC-MCNC: 3.4 G/DL (ref 2–4)
GLUCOSE SERPL-MCNC: 98 MG/DL (ref 65–100)
HCT VFR BLD AUTO: 48.2 % (ref 35–47)
HGB BLD-MCNC: 16.1 G/DL (ref 11.5–16)
IMM GRANULOCYTES # BLD AUTO: 0 K/UL (ref 0–0.04)
IMM GRANULOCYTES NFR BLD AUTO: 0 % (ref 0–0.5)
LYMPHOCYTES # BLD: 2.2 K/UL (ref 0.8–3.5)
LYMPHOCYTES NFR BLD: 22 % (ref 12–49)
MCH RBC QN AUTO: 31.3 PG (ref 26–34)
MCHC RBC AUTO-ENTMCNC: 33.4 G/DL (ref 30–36.5)
MCV RBC AUTO: 93.6 FL (ref 80–99)
MONOCYTES # BLD: 0.6 K/UL (ref 0–1)
MONOCYTES NFR BLD: 6 % (ref 5–13)
NEUTS SEG # BLD: 7.2 K/UL (ref 1.8–8)
NEUTS SEG NFR BLD: 71 % (ref 32–75)
NRBC # BLD: 0 K/UL (ref 0–0.01)
NRBC BLD-RTO: 0 PER 100 WBC
PLATELET # BLD AUTO: 345 K/UL (ref 150–400)
PMV BLD AUTO: 9.3 FL (ref 8.9–12.9)
POTASSIUM SERPL-SCNC: 4.6 MMOL/L (ref 3.5–5.1)
PROT SERPL-MCNC: 7.2 G/DL (ref 6.4–8.2)
RBC # BLD AUTO: 5.15 M/UL (ref 3.8–5.2)
SODIUM SERPL-SCNC: 141 MMOL/L (ref 136–145)
TROPONIN I SERPL HS-MCNC: 7 NG/L (ref 0–51)
WBC # BLD AUTO: 10.1 K/UL (ref 3.6–11)

## 2024-01-11 PROCEDURE — 99285 EMERGENCY DEPT VISIT HI MDM: CPT

## 2024-01-11 PROCEDURE — 84484 ASSAY OF TROPONIN QUANT: CPT

## 2024-01-11 PROCEDURE — 36415 COLL VENOUS BLD VENIPUNCTURE: CPT

## 2024-01-11 PROCEDURE — 70498 CT ANGIOGRAPHY NECK: CPT

## 2024-01-11 PROCEDURE — 6370000000 HC RX 637 (ALT 250 FOR IP): Performed by: EMERGENCY MEDICINE

## 2024-01-11 PROCEDURE — 93005 ELECTROCARDIOGRAM TRACING: CPT | Performed by: EMERGENCY MEDICINE

## 2024-01-11 PROCEDURE — 80053 COMPREHEN METABOLIC PANEL: CPT

## 2024-01-11 PROCEDURE — 70450 CT HEAD/BRAIN W/O DYE: CPT

## 2024-01-11 PROCEDURE — 71045 X-RAY EXAM CHEST 1 VIEW: CPT

## 2024-01-11 PROCEDURE — 85025 COMPLETE CBC W/AUTO DIFF WBC: CPT

## 2024-01-11 PROCEDURE — 6360000004 HC RX CONTRAST MEDICATION: Performed by: EMERGENCY MEDICINE

## 2024-01-11 RX ORDER — ALPRAZOLAM 0.25 MG/1
0.25 TABLET ORAL
Status: COMPLETED | OUTPATIENT
Start: 2024-01-11 | End: 2024-01-11

## 2024-01-11 RX ADMIN — ALPRAZOLAM 0.25 MG: 0.25 TABLET ORAL at 15:05

## 2024-01-11 RX ADMIN — IOPAMIDOL 100 ML: 755 INJECTION, SOLUTION INTRAVENOUS at 15:32

## 2024-01-11 ASSESSMENT — LIFESTYLE VARIABLES
HOW OFTEN DO YOU HAVE A DRINK CONTAINING ALCOHOL: NEVER
HOW MANY STANDARD DRINKS CONTAINING ALCOHOL DO YOU HAVE ON A TYPICAL DAY: PATIENT DOES NOT DRINK

## 2024-01-11 ASSESSMENT — PAIN SCALES - GENERAL: PAINLEVEL_OUTOF10: 0

## 2024-01-11 ASSESSMENT — PAIN - FUNCTIONAL ASSESSMENT: PAIN_FUNCTIONAL_ASSESSMENT: NONE - DENIES PAIN

## 2024-01-11 NOTE — ED TRIAGE NOTES
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

## 2024-01-11 NOTE — DISCHARGE INSTRUCTIONS
are degenerative changes of the cervical spine.   CTA HEAD   Cavernous carotid atherosclerotic change. There is a posterior communicating   artery on the right.. Petrous and cavernous carotid arteries are patent..M1   segments are patent. Symmetric arborization of M2 vessels is demonstrated. The   basilar artery is patent.. The proximal P1 segments are patent   bilaterally..There is no aneurysm. There is a small posterior meningeal artery   on the left..   No evidence of acute intracranial hemorrhage or midline shift is demonstrated.   IMPRESSION:    There is no major vessel occlusion.   There is no acute intracranial process.   There is no aneurysm, dissection or hemodynamically significant stenosis.            XR CHEST PORTABLE   Final Result      No acute process.           ------------------------------------------------------------------------------------------------------------  The exam and treatment you received in the Emergency Department were for an urgent problem and are not intended as complete care. It is important that you follow-up with a doctor, nurse practitioner, or physician assistant to:  (1) confirm your diagnosis,  (2) re-evaluation of changes in your illness and treatment, and  (3) for ongoing care. Please take your discharge instructions with you when you go to your follow-up appointment.     If you have any problem arranging a follow-up appointment, contact the Emergency Department.  If your symptoms become worse or you do not improve as expected and you are unable to reach your health care provider, please return to the Emergency Department. We are available 24 hours a day.     If a prescription has been provided, please have it filled as soon as possible to prevent a delay in treatment. If you have any questions or reservations about taking the medication due to side effects or interactions with other medications, please call your primary care provider or contact the ER.

## 2024-01-11 NOTE — ED PROVIDER NOTES
[facility] EMERGENCY DEPT  EMERGENCY DEPARTMENT HISTORY AND PHYSICAL EXAM      Date: 1/11/2024  Patient Name: Valentine Red  MRN: [id number]  YOB: 1960  Date of evaluation: 1/11/2024  Provider: Jessee Chanel DO   Note Started: 5:09 PM EST 1/11/24    HISTORY OF PRESENT ILLNESS     Chief Complaint   Patient presents with    Loss of Consciousness       History Provided By: Patient    HPI: Valentine Red is a 63 y.o. female with past medical history significant for the below presenting to the emergency department for evaluation of near syncope.  Patient states that she was taking a box out to the dumpster while at work when she subsequently developed dizziness, lightheadedness.  Patient states that symptoms did resolve with rest.  Has a history of vertigo, however states that symptoms are not like her previous episodes of vertigo.  Reports vision turning black with neck flexion and extension.  Denies upper/lower extremity paresthesias, weakness    PAST MEDICAL HISTORY   Past Medical History:  Past Medical History:   Diagnosis Date    Adverse effect of anesthesia     woke up during iliac stenting and carpal tunnel release    Anxiety and depression     Arthritis     shoulder, neck    Cataract     not requiring surgery at this time    Chronic obstructive pulmonary disease (HCC)     Diverticulitis     Fibromyalgia     H/O seasonal allergies     Lumbar herniated disc     x3    PAD (peripheral artery disease) (HCC)        Past Surgical History:  Past Surgical History:   Procedure Laterality Date    CARPAL TUNNEL RELEASE Right     CERVICAL FUSION  2013    C5-C7    COLONOSCOPY N/A 04/08/2022    COLONOSCOPY performed by Jesus Manuel Hutton MD at [facility] ENDOSCOPY    REFRACTIVE SURGERY Bilateral 2003    VASCULAR SURGERY  2022    stents placed bilateral iliac arteries    VEIN SURGERY Right 2018       Family History:  Family History   Problem Relation Age of Onset    Cancer Father         lung       Social History:  Social History

## 2024-05-30 ENCOUNTER — TRANSCRIBE ORDERS (OUTPATIENT)
Facility: HOSPITAL | Age: 64
End: 2024-05-30

## 2024-05-30 DIAGNOSIS — R07.9 CHEST PAIN, UNSPECIFIED TYPE: Primary | ICD-10-CM

## 2025-02-25 ENCOUNTER — TRANSCRIBE ORDERS (OUTPATIENT)
Facility: HOSPITAL | Age: 65
End: 2025-02-25

## 2025-02-25 DIAGNOSIS — Z12.39 BREAST CANCER SCREENING, HIGH RISK PATIENT: ICD-10-CM

## 2025-02-25 DIAGNOSIS — Z78.0 POSTMENOPAUSAL: Primary | ICD-10-CM

## 2025-02-25 DIAGNOSIS — Z12.31 ENCOUNTER FOR SCREENING MAMMOGRAM FOR HIGH-RISK PATIENT: Primary | ICD-10-CM

## 2025-03-07 ENCOUNTER — HOSPITAL ENCOUNTER (OUTPATIENT)
Facility: HOSPITAL | Age: 65
Discharge: HOME | End: 2025-03-10
Payer: MEDICARE

## 2025-03-07 VITALS — HEIGHT: 63 IN | BODY MASS INDEX: 26.05 KG/M2 | WEIGHT: 147 LBS

## 2025-03-07 DIAGNOSIS — Z12.31 ENCOUNTER FOR SCREENING MAMMOGRAM FOR HIGH-RISK PATIENT: ICD-10-CM

## 2025-03-07 DIAGNOSIS — Z78.0 POSTMENOPAUSAL: ICD-10-CM

## 2025-03-07 PROCEDURE — 77080 DXA BONE DENSITY AXIAL: CPT

## 2025-03-07 PROCEDURE — 77063 BREAST TOMOSYNTHESIS BI: CPT

## 2025-06-21 ENCOUNTER — APPOINTMENT (OUTPATIENT)
Facility: HOSPITAL | Age: 65
End: 2025-06-21
Payer: MEDICARE

## 2025-06-21 ENCOUNTER — HOSPITAL ENCOUNTER (EMERGENCY)
Facility: HOSPITAL | Age: 65
Discharge: HOME | End: 2025-06-21
Attending: EMERGENCY MEDICINE
Payer: MEDICARE

## 2025-06-21 ENCOUNTER — APPOINTMENT (OUTPATIENT)
Facility: HOSPITAL | Age: 65
End: 2025-06-21
Attending: EMERGENCY MEDICINE
Payer: MEDICARE

## 2025-06-21 VITALS
SYSTOLIC BLOOD PRESSURE: 119 MMHG | TEMPERATURE: 97.9 F | HEART RATE: 62 BPM | HEIGHT: 63 IN | WEIGHT: 142 LBS | OXYGEN SATURATION: 93 % | RESPIRATION RATE: 16 BRPM | DIASTOLIC BLOOD PRESSURE: 87 MMHG | BODY MASS INDEX: 25.16 KG/M2

## 2025-06-21 DIAGNOSIS — N39.0 URINARY TRACT INFECTION WITHOUT HEMATURIA, SITE UNSPECIFIED: ICD-10-CM

## 2025-06-21 DIAGNOSIS — M79.604 RIGHT LEG PAIN: Primary | ICD-10-CM

## 2025-06-21 LAB
ALBUMIN SERPL-MCNC: 3.2 G/DL (ref 3.5–5)
ALBUMIN/GLOB SERPL: 0.9 (ref 1.1–2.2)
ALP SERPL-CCNC: 113 U/L (ref 45–117)
ALT SERPL-CCNC: 20 U/L (ref 12–78)
ANION GAP SERPL CALC-SCNC: 5 MMOL/L (ref 2–12)
AST SERPL W P-5'-P-CCNC: ABNORMAL U/L (ref 15–37)
BASOPHILS # BLD: 0.02 K/UL (ref 0–0.1)
BASOPHILS NFR BLD: 0.3 % (ref 0–1)
BILIRUB SERPL-MCNC: 0.5 MG/DL (ref 0.2–1)
BUN SERPL-MCNC: 9 MG/DL (ref 6–20)
BUN/CREAT SERPL: 13 (ref 12–20)
CA-I BLD-MCNC: 9.1 MG/DL (ref 8.5–10.1)
CHLORIDE SERPL-SCNC: 109 MMOL/L (ref 97–108)
CO2 SERPL-SCNC: 26 MMOL/L (ref 21–32)
CREAT SERPL-MCNC: 0.7 MG/DL (ref 0.55–1.02)
DIFFERENTIAL METHOD BLD: ABNORMAL
EOSINOPHIL # BLD: 0.05 K/UL (ref 0–0.4)
EOSINOPHIL NFR BLD: 0.7 % (ref 0–7)
ERYTHROCYTE [DISTWIDTH] IN BLOOD BY AUTOMATED COUNT: 14.7 % (ref 11.5–14.5)
GLOBULIN SER CALC-MCNC: 3.5 G/DL (ref 2–4)
GLUCOSE SERPL-MCNC: 104 MG/DL (ref 65–100)
HCT VFR BLD AUTO: 39.1 % (ref 35–47)
HGB BLD-MCNC: 13.1 G/DL (ref 11.5–16)
IMM GRANULOCYTES # BLD AUTO: 0.01 K/UL (ref 0–0.04)
IMM GRANULOCYTES NFR BLD AUTO: 0.1 % (ref 0–0.5)
LYMPHOCYTES # BLD: 2.22 K/UL (ref 0.8–3.5)
LYMPHOCYTES NFR BLD: 31.2 % (ref 12–49)
MCH RBC QN AUTO: 32.3 PG (ref 26–34)
MCHC RBC AUTO-ENTMCNC: 33.5 G/DL (ref 30–36.5)
MCV RBC AUTO: 96.5 FL (ref 80–99)
MONOCYTES # BLD: 0.59 K/UL (ref 0–1)
MONOCYTES NFR BLD: 8.3 % (ref 5–13)
NEUTS SEG # BLD: 4.23 K/UL (ref 1.8–8)
NEUTS SEG NFR BLD: 59.4 % (ref 32–75)
NRBC # BLD: 0 K/UL (ref 0–0.01)
NRBC BLD-RTO: 0 PER 100 WBC
PLATELET # BLD AUTO: 310 K/UL (ref 150–400)
PMV BLD AUTO: 10 FL (ref 8.9–12.9)
POTASSIUM SERPL-SCNC: ABNORMAL MMOL/L (ref 3.5–5.1)
PROT SERPL-MCNC: 6.7 G/DL (ref 6.4–8.2)
RBC # BLD AUTO: 4.05 M/UL (ref 3.8–5.2)
SODIUM SERPL-SCNC: 140 MMOL/L (ref 136–145)
TROPONIN I SERPL HS-MCNC: 6 NG/L (ref 0–51)
WBC # BLD AUTO: 7.1 K/UL (ref 3.6–11)

## 2025-06-21 PROCEDURE — 93971 EXTREMITY STUDY: CPT

## 2025-06-21 PROCEDURE — 85025 COMPLETE CBC W/AUTO DIFF WBC: CPT

## 2025-06-21 PROCEDURE — 36415 COLL VENOUS BLD VENIPUNCTURE: CPT

## 2025-06-21 PROCEDURE — 71045 X-RAY EXAM CHEST 1 VIEW: CPT

## 2025-06-21 PROCEDURE — 80053 COMPREHEN METABOLIC PANEL: CPT

## 2025-06-21 PROCEDURE — 6370000000 HC RX 637 (ALT 250 FOR IP): Performed by: EMERGENCY MEDICINE

## 2025-06-21 PROCEDURE — 99285 EMERGENCY DEPT VISIT HI MDM: CPT

## 2025-06-21 PROCEDURE — 2580000003 HC RX 258: Performed by: EMERGENCY MEDICINE

## 2025-06-21 PROCEDURE — 84484 ASSAY OF TROPONIN QUANT: CPT

## 2025-06-21 PROCEDURE — 93005 ELECTROCARDIOGRAM TRACING: CPT | Performed by: EMERGENCY MEDICINE

## 2025-06-21 PROCEDURE — 73564 X-RAY EXAM KNEE 4 OR MORE: CPT

## 2025-06-21 RX ORDER — HYDROCODONE BITARTRATE AND ACETAMINOPHEN 5; 325 MG/1; MG/1
1 TABLET ORAL
Refills: 0 | Status: COMPLETED | OUTPATIENT
Start: 2025-06-21 | End: 2025-06-21

## 2025-06-21 RX ORDER — 0.9 % SODIUM CHLORIDE 0.9 %
250 INTRAVENOUS SOLUTION INTRAVENOUS ONCE
Status: COMPLETED | OUTPATIENT
Start: 2025-06-21 | End: 2025-06-21

## 2025-06-21 RX ORDER — HYDROCODONE BITARTRATE AND ACETAMINOPHEN 5; 325 MG/1; MG/1
1 TABLET ORAL EVERY 6 HOURS PRN
Qty: 10 TABLET | Refills: 0 | Status: SHIPPED | OUTPATIENT
Start: 2025-06-21 | End: 2025-06-24

## 2025-06-21 RX ADMIN — SODIUM CHLORIDE 250 ML: 0.9 INJECTION, SOLUTION INTRAVENOUS at 15:35

## 2025-06-21 RX ADMIN — HYDROCODONE BITARTRATE AND ACETAMINOPHEN 1 TABLET: 5; 325 TABLET ORAL at 13:36

## 2025-06-21 ASSESSMENT — PAIN SCALES - GENERAL: PAINLEVEL_OUTOF10: 9

## 2025-06-21 ASSESSMENT — PAIN DESCRIPTION - LOCATION: LOCATION: LEG

## 2025-06-21 NOTE — PROGRESS NOTES
Venous Duplex attempted at 1410 and pt was having labs done and still needed to get undressed for exam, will attempt again

## 2025-06-21 NOTE — DISCHARGE INSTRUCTIONS
Thank you for choosing our Emergency Department for your care.  It is our privilege to care for you in your time of need.  In the next several days, you may receive a survey via email or mailed to your home about your experience with our team.  We would greatly appreciate you taking a few minutes to complete the survey, as we use this information to learn what we have done well and what we could be doing better. Thank you for trusting us with your care!    Below you will find a list of your tests from today's visit.   Labs and Radiology Studies  Recent Results (from the past 12 hours)   CBC with Auto Differential    Collection Time: 06/21/25  2:16 PM   Result Value Ref Range    WBC 7.1 3.6 - 11.0 K/uL    RBC 4.05 3.80 - 5.20 M/uL    Hemoglobin 13.1 11.5 - 16.0 g/dL    Hematocrit 39.1 35.0 - 47.0 %    MCV 96.5 80.0 - 99.0 FL    MCH 32.3 26.0 - 34.0 PG    MCHC 33.5 30.0 - 36.5 g/dL    RDW 14.7 (H) 11.5 - 14.5 %    Platelets 310 150 - 400 K/uL    MPV 10.0 8.9 - 12.9 FL    Nucleated RBCs 0.0 0.0  WBC    nRBC 0.00 0.00 - 0.01 K/uL    Neutrophils % 59.4 32.0 - 75.0 %    Lymphocytes % 31.2 12.0 - 49.0 %    Monocytes % 8.3 5.0 - 13.0 %    Eosinophils % 0.7 0.0 - 7.0 %    Basophils % 0.3 0.0 - 1.0 %    Immature Granulocytes % 0.1 0 - 0.5 %    Neutrophils Absolute 4.23 1.80 - 8.00 K/UL    Lymphocytes Absolute 2.22 0.80 - 3.50 K/UL    Monocytes Absolute 0.59 0.00 - 1.00 K/UL    Eosinophils Absolute 0.05 0.00 - 0.40 K/UL    Basophils Absolute 0.02 0.00 - 0.10 K/UL    Immature Granulocytes Absolute 0.01 0.00 - 0.04 K/UL    Differential Type AUTOMATED     Troponin    Collection Time: 06/21/25  2:16 PM   Result Value Ref Range    Troponin, High Sensitivity 6 0 - 51 ng/L   Comprehensive Metabolic Panel    Collection Time: 06/21/25  2:16 PM   Result Value Ref Range    Sodium 140 136 - 145 mmol/L    Potassium Hemolyzed, Recollection Recommended 3.5 - 5.1 mmol/L    Chloride 109 (H) 97 - 108 mmol/L    CO2 26 21 - 32 mmol/L

## 2025-06-21 NOTE — ED TRIAGE NOTES
Increased right lower leg pain, concerned for blood clot, increased swelling, feels like a knot behind leg. \    Patient first concerned for abnormal EKG, concerned patient might of had a Heart attack. Patient denies any chest pain.

## 2025-06-22 LAB
ECHO BSA: 1.69 M2
EKG ATRIAL RATE: 110 BPM
EKG DIAGNOSIS: NORMAL
EKG P-R INTERVAL: 134 MS
EKG Q-T INTERVAL: 362 MS
EKG QRS DURATION: 154 MS
EKG QTC CALCULATION (BAZETT): 489 MS
EKG R AXIS: -43 DEGREES
EKG T AXIS: 165 DEGREES
EKG VENTRICULAR RATE: 110 BPM

## 2025-06-22 PROCEDURE — 93971 EXTREMITY STUDY: CPT | Performed by: SURGERY

## 2025-06-22 RX ORDER — CEPHALEXIN 500 MG/1
500 CAPSULE ORAL 4 TIMES DAILY
Qty: 40 CAPSULE | Refills: 0 | Status: SHIPPED | OUTPATIENT
Start: 2025-06-22 | End: 2025-07-02

## 2025-06-22 NOTE — ED PROVIDER NOTES
[facility] EMERGENCY DEPT  EMERGENCY DEPARTMENT HISTORY AND PHYSICAL EXAM      Date of evaluation: 6/21/2025  Patient Name: Valentine Red  Birthdate 1960  MRN: [id number]  ED Provider: Kristie Ho MD   Note Started: 1:05 PM EDT 6/22/25    HISTORY OF PRESENT ILLNESS     Chief Complaint   Patient presents with    Leg Pain       History Provided By: Patient, only     HPI: Valentine Red is a 65 y.o. female patient with a history of dementia.  Presents with multiple complaints.  Initially reported that she had right leg pain however upon arrival patient denies any right leg pain.  Complained of chest pain to EMS but denies that she ever had chest pain.  Now she has pain over a old scar on her leg.  Family is present and notes that she is at her baseline mentation    PAST MEDICAL HISTORY   Past Medical History:  Past Medical History:   Diagnosis Date    Adverse effect of anesthesia     woke up during iliac stenting and carpal tunnel release    Anxiety and depression     Arthritis     shoulder, neck    Cataract     not requiring surgery at this time    Chronic obstructive pulmonary disease (HCC)     Diverticulitis     Fibromyalgia     H/O seasonal allergies     Lumbar herniated disc     x3    PAD (peripheral artery disease)        Past Surgical History:  Past Surgical History:   Procedure Laterality Date    CARPAL TUNNEL RELEASE Right     CERVICAL FUSION  2013    C5-C7    COLONOSCOPY N/A 04/08/2022    COLONOSCOPY performed by Jesus Manuel Hutton MD at [facility] ENDOSCOPY    REFRACTIVE SURGERY Bilateral 2003    VASCULAR SURGERY  2022    stents placed bilateral iliac arteries    VEIN SURGERY Right 2018       Family History:  Family History   Problem Relation Age of Onset    Cancer Father         lung    Breast Cancer Niece        Social History:  Social History     Tobacco Use    Smoking status: Some Days     Current packs/day: 0.25     Types: Cigarettes    Smokeless tobacco: Never   Substance Use Topics    Alcohol use: No    Drug

## 2025-06-29 PROBLEM — M79.604 RIGHT LEG PAIN: Status: ACTIVE | Noted: 2025-06-29

## 2025-08-22 ENCOUNTER — TRANSCRIBE ORDERS (OUTPATIENT)
Facility: HOSPITAL | Age: 65
End: 2025-08-22

## 2025-08-22 ENCOUNTER — HOSPITAL ENCOUNTER (OUTPATIENT)
Facility: HOSPITAL | Age: 65
Discharge: HOME | End: 2025-08-25
Payer: MEDICARE

## 2025-08-22 DIAGNOSIS — M16.12 PRIMARY OSTEOARTHRITIS OF LEFT HIP: ICD-10-CM

## 2025-08-22 DIAGNOSIS — M16.12 PRIMARY OSTEOARTHRITIS OF LEFT HIP: Primary | ICD-10-CM

## 2025-08-22 PROCEDURE — 73502 X-RAY EXAM HIP UNI 2-3 VIEWS: CPT

## (undated) DEVICE — PADDING CST 4IN STERILE --

## (undated) DEVICE — BLADE SAW OSC COARSE 25X9MM --

## (undated) DEVICE — GLOVE SURG SZ 65 L12IN FNGR THK79MIL GRN LTX FREE

## (undated) DEVICE — CANISTER, RIGID, 3000CC: Brand: MEDLINE INDUSTRIES, INC.

## (undated) DEVICE — SIMPLICITY FLUFF UNDERPAD 23X36, MODERATE: Brand: SIMPLICITY

## (undated) DEVICE — (D)SENSOR RMFG 02 PULS OXMTR -- DISC BY MFR USE ITEM 133445

## (undated) DEVICE — KIT COLON W/ 1.1OZ LUB AND 2 END

## (undated) DEVICE — ZIMMER® STERILE DISPOSABLE TOURNIQUET CUFF WITH PROTECTIVE SLEEVE AND PLC, DUAL PORT, SINGLE BLADDER, 18 IN. (46 CM)

## (undated) DEVICE — Device

## (undated) DEVICE — GLOVE SURG SZ 65 THK91MIL LTX FREE SYN POLYISOPRENE

## (undated) DEVICE — BASIN EMSIS 16OZ GRAPHITE PLAS KID SHP MOLD GRAD FOR ORAL

## (undated) DEVICE — 3M™ CUROS™ DISINFECTING CAP FOR NEEDLELESS CONNECTORS 270/CARTON 20 CARTONS/CASE CFF1-270: Brand: CUROS™

## (undated) DEVICE — BNDG ELAS HK LOOP 6X10YD LF -- MATRIX

## (undated) DEVICE — BAG BELONG PT PERS CLEAR HANDL

## (undated) DEVICE — 1200 GUARD II KIT W/5MM TUBE W/O VAC TUBE: Brand: GUARDIAN

## (undated) DEVICE — SPONGE GZ W4XL4IN COT 12 PLY TYP VII WVN C FLD DSGN STERILE

## (undated) DEVICE — ELECTRODE,RADIOTRANSLUCENT,FOAM,3PK: Brand: MEDLINE

## (undated) DEVICE — SOLUTION IRRIG 500ML 0.9% SOD CHLO USP POUR PLAS BTL

## (undated) DEVICE — CATH IV AUTOGRD BC PNK 20GA 25 -- INSYTE

## (undated) DEVICE — CUFF RMFG BP INF SZ 11 DISP -- LAWSON OEM ITEM 238915

## (undated) DEVICE — CANN NASAL O2 CAPNOGRAPHY AD -- FILTERLINE

## (undated) DEVICE — SUTURE MCRYL SZ 4-0 L27IN ABSRB UD L19MM PS-2 1/2 CIR PRIM Y426H

## (undated) DEVICE — SET GRAV CK VLV NEEDLESS ST 3 GANGED 4WAY STPCOCK HI FLO 10

## (undated) DEVICE — EXTREMITY-SFMCASU: Brand: MEDLINE INDUSTRIES, INC.

## (undated) DEVICE — SOLIDIFIER MEDC 1200ML -- CONVERT TO 356117